# Patient Record
Sex: FEMALE | Race: WHITE | NOT HISPANIC OR LATINO | Employment: UNEMPLOYED | URBAN - METROPOLITAN AREA
[De-identification: names, ages, dates, MRNs, and addresses within clinical notes are randomized per-mention and may not be internally consistent; named-entity substitution may affect disease eponyms.]

---
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
## 2025-01-16 ENCOUNTER — OFFICE VISIT (OUTPATIENT)
Dept: FAMILY MEDICINE CLINIC | Facility: CLINIC | Age: 20
End: 2025-01-16
Payer: COMMERCIAL

## 2025-01-16 VITALS
RESPIRATION RATE: 14 BRPM | DIASTOLIC BLOOD PRESSURE: 70 MMHG | BODY MASS INDEX: 18.99 KG/M2 | TEMPERATURE: 98.9 F | HEART RATE: 110 BPM | OXYGEN SATURATION: 97 % | SYSTOLIC BLOOD PRESSURE: 106 MMHG | HEIGHT: 67 IN | WEIGHT: 121 LBS

## 2025-01-16 DIAGNOSIS — K14.3 TONGUE COATING: Primary | ICD-10-CM

## 2025-01-16 PROCEDURE — 99213 OFFICE O/P EST LOW 20 MIN: CPT | Performed by: NURSE PRACTITIONER

## 2025-01-16 NOTE — PROGRESS NOTES
"Name: Kerry Maier      : 2005      MRN: [id number]  Encounter Provider: DWAYNE Grigsby  Encounter Date: 2025   Encounter department: [facility]  :  Assessment & Plan  Tongue coating       Most likely bacterial overgrowth d/t dry mouth. Patient may use Biotene dry mouth rinse and antibacterial mouth rinse OTC. Pt encouraged to continue to keep well hydrated. Patient is encouraged to call our office for any questions/concerns, persistent or worsening symptoms. Patient states they understand and agree with treatment plan.     Pt to f/u PRN.     History of Present Illness     Pt presents today for concerns over tongue coating that she notes since last July.  Pt was treated in August for thrush after being on Flagyl from her GYN.  She notes the coating dissipated, except for small area to posterior tongue.  Pt denies pain to tongue.   No issues w/ swallowing.  No worsening of the coating since August.  Pt drinks plenty of water throughout the day.      Review of Systems  As noted per HPI.  Objective   /70   Pulse (!) 110   Temp 98.9 °F (37.2 °C)   Resp 14   Ht 5' 7\" (1.702 m)   Wt 54.9 kg (121 lb)   SpO2 97%   BMI 18.95 kg/m²      Physical Exam  Vitals reviewed.   Constitutional:       Appearance: Normal appearance.   HENT:      Mouth/Throat:      Comments: Small amount of tongue coating to posterior emy tongue that is yellow in color and can be scraped off w/ tongue depressor. No clusters of white coating nor lesions noted.  Neurological:      Mental Status: She is alert.         "

## 2025-01-16 NOTE — PATIENT INSTRUCTIONS
Biotene dry mouth rinse.  I would look for an antibacterial mouth wash over the counter. If you find that alcohol based rinses are irritating or drying, you can switch to a non-alcohol mouthwash.

## 2025-02-26 ENCOUNTER — TELEPHONE (OUTPATIENT)
Age: 20
End: 2025-02-26

## 2025-02-26 NOTE — TELEPHONE ENCOUNTER
Mother called. Transferred from [place] to [facility] for Pt  c/o unusual migraine with numbness symptoms. Pt is away at college and mother is requesting advice.    Upon transfer Mother states that her daughter just spoke with someone at the Doctor office and is going to the ED now. Confirmed this would be CTS advice. Mother does not have further questions at this time.

## 2025-02-26 NOTE — TELEPHONE ENCOUNTER
Patient is having bad Ocular Migraine since 12:00 PM. She took Migraine Excedrin it is not helping. Her lips, mouth and hands are numb.  Patient is at her school and would like to know should she go the hospital near her or something can be prescribed virtual visit can be arranged. Please call to advise.    Thank you!!

## 2025-07-29 ENCOUNTER — OFFICE VISIT (OUTPATIENT)
Dept: FAMILY MEDICINE CLINIC | Facility: CLINIC | Age: 20
End: 2025-07-29
Payer: COMMERCIAL

## 2025-07-29 VITALS
WEIGHT: 132.2 LBS | DIASTOLIC BLOOD PRESSURE: 64 MMHG | RESPIRATION RATE: 14 BRPM | HEIGHT: 67 IN | OXYGEN SATURATION: 100 % | HEART RATE: 112 BPM | TEMPERATURE: 98.2 F | BODY MASS INDEX: 20.75 KG/M2 | SYSTOLIC BLOOD PRESSURE: 102 MMHG

## 2025-07-29 DIAGNOSIS — R53.83 OTHER FATIGUE: ICD-10-CM

## 2025-07-29 DIAGNOSIS — Z00.00 ANNUAL PHYSICAL EXAM: Primary | ICD-10-CM

## 2025-07-29 DIAGNOSIS — Z83.49 FAMILY HISTORY OF THYROID DISEASE: ICD-10-CM

## 2025-07-29 PROCEDURE — 99395 PREV VISIT EST AGE 18-39: CPT | Performed by: NURSE PRACTITIONER

## 2025-07-29 PROCEDURE — 99213 OFFICE O/P EST LOW 20 MIN: CPT | Performed by: NURSE PRACTITIONER

## 2025-08-14 LAB
25(OH)D3 SERPL-MCNC: 69 NG/ML (ref 30–100)
ALBUMIN SERPL-MCNC: 4.9 G/DL (ref 3.6–5.1)
ALBUMIN/GLOB SERPL: 2 (CALC) (ref 1–2.5)
ALP SERPL-CCNC: 50 U/L (ref 31–125)
ALT SERPL-CCNC: 12 U/L (ref 6–29)
AST SERPL-CCNC: 16 U/L (ref 10–30)
BASOPHILS # BLD AUTO: 116 CELLS/UL (ref 0–200)
BASOPHILS NFR BLD AUTO: 1.3 %
BILIRUB SERPL-MCNC: 0.9 MG/DL (ref 0.2–1.2)
BUN SERPL-MCNC: 11 MG/DL (ref 7–25)
BUN/CREAT SERPL: NORMAL (CALC) (ref 6–22)
CALCIUM SERPL-MCNC: 9.9 MG/DL (ref 8.6–10.2)
CHLORIDE SERPL-SCNC: 104 MMOL/L (ref 98–110)
CO2 SERPL-SCNC: 26 MMOL/L (ref 20–32)
CREAT SERPL-MCNC: 0.75 MG/DL (ref 0.5–0.96)
EOSINOPHIL # BLD AUTO: 1486 CELLS/UL (ref 15–500)
EOSINOPHIL NFR BLD AUTO: 16.7 %
ERYTHROCYTE [DISTWIDTH] IN BLOOD BY AUTOMATED COUNT: 11.6 % (ref 11–15)
FERRITIN SERPL-MCNC: 52 NG/ML (ref 16–154)
GFR/BSA.PRED SERPLBLD CYS-BASED-ARV: 117 ML/MIN/1.73M2
GLOBULIN SER CALC-MCNC: 2.4 G/DL (CALC) (ref 1.9–3.7)
GLUCOSE SERPL-MCNC: 81 MG/DL (ref 65–99)
HCT VFR BLD AUTO: 43.5 % (ref 35–45)
HGB BLD-MCNC: 14.4 G/DL (ref 11.7–15.5)
IRON SATN MFR SERPL: 35 % (CALC) (ref 16–45)
IRON SERPL-MCNC: 119 MCG/DL (ref 40–190)
LYMPHOCYTES # BLD AUTO: 1771 CELLS/UL (ref 850–3900)
LYMPHOCYTES NFR BLD AUTO: 19.9 %
MCH RBC QN AUTO: 32 PG (ref 27–33)
MCHC RBC AUTO-ENTMCNC: 33.1 G/DL (ref 32–36)
MCV RBC AUTO: 96.7 FL (ref 80–100)
MONOCYTES # BLD AUTO: 614 CELLS/UL (ref 200–950)
MONOCYTES NFR BLD AUTO: 6.9 %
NEUTROPHILS # BLD AUTO: 4913 CELLS/UL (ref 1500–7800)
NEUTROPHILS NFR BLD AUTO: 55.2 %
PLATELET # BLD AUTO: 338 THOUSAND/UL (ref 140–400)
PMV BLD REES-ECKER: 9.2 FL (ref 7.5–12.5)
POTASSIUM SERPL-SCNC: 4.2 MMOL/L (ref 3.5–5.3)
PROT SERPL-MCNC: 7.3 G/DL (ref 6.1–8.1)
RBC # BLD AUTO: 4.5 MILLION/UL (ref 3.8–5.1)
SODIUM SERPL-SCNC: 143 MMOL/L (ref 135–146)
TIBC SERPL-MCNC: 337 MCG/DL (CALC) (ref 250–450)
TSH SERPL-ACNC: 0.92 MIU/L
VIT B12 SERPL-MCNC: 326 PG/ML (ref 200–1100)
WBC # BLD AUTO: 8.9 THOUSAND/UL (ref 3.8–10.8)